# Patient Record
Sex: FEMALE | Race: WHITE | Employment: PART TIME | URBAN - METROPOLITAN AREA
[De-identification: names, ages, dates, MRNs, and addresses within clinical notes are randomized per-mention and may not be internally consistent; named-entity substitution may affect disease eponyms.]

---

## 2017-03-02 ENCOUNTER — HOSPITAL ENCOUNTER (OUTPATIENT)
Dept: WOMENS IMAGING | Age: 62
Discharge: HOME | End: 2017-03-02
Attending: SURGERY | Admitting: SURGERY

## 2017-03-02 DIAGNOSIS — Z12.31 VISIT FOR SCREENING MAMMOGRAM: ICD-10-CM

## 2017-08-03 ENCOUNTER — TELEPHONE (OUTPATIENT)
Dept: CASE MANAGEMENT | Age: 62
End: 2017-08-03

## 2017-11-27 ENCOUNTER — HOSPITAL ENCOUNTER (OUTPATIENT)
Dept: CT IMAGING | Age: 62
Discharge: HOME | End: 2017-11-27
Attending: INTERNAL MEDICINE | Admitting: INTERNAL MEDICINE

## 2017-11-27 DIAGNOSIS — Z87.891 PERSONAL HISTORY OF NICOTINE DEPENDENCE: ICD-10-CM

## 2017-11-27 DIAGNOSIS — Z87.891 PERSONAL HISTORY OF TOBACCO USE: ICD-10-CM

## 2018-03-08 ENCOUNTER — HOSPITAL ENCOUNTER (OUTPATIENT)
Dept: WOMENS IMAGING | Age: 63
Discharge: HOME | End: 2018-03-08
Attending: SURGERY | Admitting: SURGERY

## 2018-03-08 DIAGNOSIS — Z12.31 VISIT FOR SCREENING MAMMOGRAM: ICD-10-CM

## 2018-12-06 ENCOUNTER — HOSPITAL ENCOUNTER (OUTPATIENT)
Dept: CT IMAGING | Age: 63
Discharge: HOME | End: 2018-12-06
Payer: COMMERCIAL

## 2018-12-06 DIAGNOSIS — F17.200 TOBACCO USE DISORDER: ICD-10-CM

## 2018-12-06 PROCEDURE — 71250 CT THORAX DX C-: CPT

## 2019-03-14 ENCOUNTER — HOSPITAL ENCOUNTER (OUTPATIENT)
Dept: WOMENS IMAGING | Age: 64
Discharge: HOME | End: 2019-03-14
Payer: MEDICARE

## 2019-03-14 DIAGNOSIS — Z12.31 VISIT FOR SCREENING MAMMOGRAM: ICD-10-CM

## 2019-03-14 PROCEDURE — 77067 SCR MAMMO BI INCL CAD: CPT

## 2020-08-13 ENCOUNTER — HOSPITAL ENCOUNTER (OUTPATIENT)
Dept: WOMENS IMAGING | Age: 65
Discharge: HOME | End: 2020-08-13
Payer: MEDICARE

## 2020-08-13 PROCEDURE — 77067 SCR MAMMO BI INCL CAD: CPT

## 2021-09-02 ENCOUNTER — HOSPITAL ENCOUNTER (OUTPATIENT)
Dept: WOMENS IMAGING | Age: 66
Discharge: HOME | End: 2021-09-02
Payer: MEDICARE

## 2021-09-02 DIAGNOSIS — Z12.31 VISIT FOR SCREENING MAMMOGRAM: ICD-10-CM

## 2021-09-02 PROCEDURE — 77063 BREAST TOMOSYNTHESIS BI: CPT

## 2022-07-20 RX ORDER — ASPIRIN 81 MG/1
81 TABLET, CHEWABLE ORAL DAILY
COMMUNITY

## 2022-07-20 RX ORDER — METOPROLOL SUCCINATE 25 MG/1
25 TABLET, EXTENDED RELEASE ORAL DAILY
COMMUNITY

## 2022-07-20 NOTE — PROGRESS NOTES
[facility] ENDOSCOPY COLONOSCOPY PRE-OPERATIVE INSTRUCTIONS    Procedure date__7/25/22_______Arrival time____700________        Surgery time______800______       Clear liquids the day before the procedure. Do not eat or drink anything within 5 hours of your procedure. This includes water chewing gum, mints and ice chips. You may brush your teeth and gargle the morning of your surgery, but do not swallow the water    You may be asked to stop blood thinners such as Coumadin, Plavix, Fragmin, Lovenox, etc., or any anti-inflammatories such as:  Aspirin, Ibuprofen, Advil, Naproxen prior to your procedure. We also ask that you stop any OTC medications such as fish oil, vitamin E, glucosamine, garlic, Multivitamins, COQ 10, etc.    You must make arrangements for a responsible adult to arrive with you and stay in our waiting area during your procedure. They will also need to take you home after your procedure. For your safety you will not be allowed to leave alone or drive yourself home. Also for your safety, it is strongly suggested that someone stay with you the first 24 hours after your procedure. For your comfort, please wear simple loose fitting clothing to the center. Please do not bring valuables. If you have a living will and a durable power of  for healthcare, please bring in a copy.      You will need to bring a photo ID and insurance card    Our goal is to provide you with excellent care so if you have any questions, please contact us at the [facility] at [phone]         Please note these are generalized instructions for all colonoscopy cases, you may be provided with more specific instructions if necessary

## 2022-07-22 ENCOUNTER — ANESTHESIA EVENT (OUTPATIENT)
Dept: ENDOSCOPY | Age: 67
End: 2022-07-22
Payer: MEDICARE

## 2022-07-25 ENCOUNTER — HOSPITAL ENCOUNTER (OUTPATIENT)
Age: 67
Setting detail: OUTPATIENT SURGERY
Discharge: HOME | End: 2022-07-25
Attending: INTERNAL MEDICINE | Admitting: INTERNAL MEDICINE
Payer: MEDICARE

## 2022-07-25 ENCOUNTER — ANESTHESIA (OUTPATIENT)
Dept: ENDOSCOPY | Age: 67
End: 2022-07-25
Payer: MEDICARE

## 2022-07-25 VITALS
RESPIRATION RATE: 18 BRPM | WEIGHT: 165 LBS | SYSTOLIC BLOOD PRESSURE: 122 MMHG | DIASTOLIC BLOOD PRESSURE: 76 MMHG | HEIGHT: 63 IN | HEART RATE: 79 BPM | OXYGEN SATURATION: 100 % | BODY MASS INDEX: 29.23 KG/M2 | TEMPERATURE: 97 F

## 2022-07-25 DIAGNOSIS — Z12.11 SCREEN FOR COLON CANCER: ICD-10-CM

## 2022-07-25 PROCEDURE — 3700000001 HC ADD 15 MINUTES (ANESTHESIA): Performed by: INTERNAL MEDICINE

## 2022-07-25 PROCEDURE — 2709999900 HC NON-CHARGEABLE SUPPLY: Performed by: INTERNAL MEDICINE

## 2022-07-25 PROCEDURE — 88305 TISSUE EXAM BY PATHOLOGIST: CPT

## 2022-07-25 PROCEDURE — 3609010600 HC COLONOSCOPY POLYPECTOMY SNARE/COLD BIOPSY: Performed by: INTERNAL MEDICINE

## 2022-07-25 PROCEDURE — 7100000011 HC PHASE II RECOVERY - ADDTL 15 MIN: Performed by: INTERNAL MEDICINE

## 2022-07-25 PROCEDURE — 7100000010 HC PHASE II RECOVERY - FIRST 15 MIN: Performed by: INTERNAL MEDICINE

## 2022-07-25 PROCEDURE — 2580000003 HC RX 258: Performed by: ANESTHESIOLOGY

## 2022-07-25 PROCEDURE — 3700000000 HC ANESTHESIA ATTENDED CARE: Performed by: INTERNAL MEDICINE

## 2022-07-25 PROCEDURE — 6360000002 HC RX W HCPCS: Performed by: NURSE ANESTHETIST, CERTIFIED REGISTERED

## 2022-07-25 RX ORDER — SODIUM CHLORIDE 0.9 % (FLUSH) 0.9 %
5-40 SYRINGE (ML) INJECTION EVERY 12 HOURS SCHEDULED
Status: CANCELLED | OUTPATIENT
Start: 2022-07-25

## 2022-07-25 RX ORDER — SODIUM CHLORIDE 0.9 % (FLUSH) 0.9 %
5-40 SYRINGE (ML) INJECTION PRN
Status: CANCELLED | OUTPATIENT
Start: 2022-07-25

## 2022-07-25 RX ORDER — SODIUM CHLORIDE 0.9 % (FLUSH) 0.9 %
5-40 SYRINGE (ML) INJECTION EVERY 12 HOURS SCHEDULED
Status: DISCONTINUED | OUTPATIENT
Start: 2022-07-25 | End: 2022-07-25 | Stop reason: HOSPADM

## 2022-07-25 RX ORDER — SODIUM CHLORIDE 0.9 % (FLUSH) 0.9 %
5-40 SYRINGE (ML) INJECTION PRN
Status: DISCONTINUED | OUTPATIENT
Start: 2022-07-25 | End: 2022-07-25 | Stop reason: HOSPADM

## 2022-07-25 RX ORDER — SODIUM CHLORIDE 9 MG/ML
INJECTION, SOLUTION INTRAVENOUS PRN
Status: DISCONTINUED | OUTPATIENT
Start: 2022-07-25 | End: 2022-07-25 | Stop reason: HOSPADM

## 2022-07-25 RX ORDER — SODIUM CHLORIDE 9 MG/ML
INJECTION, SOLUTION INTRAVENOUS PRN
Status: CANCELLED | OUTPATIENT
Start: 2022-07-25

## 2022-07-25 RX ORDER — DIPHENHYDRAMINE HYDROCHLORIDE 50 MG/ML
12.5 INJECTION INTRAMUSCULAR; INTRAVENOUS
Status: CANCELLED | OUTPATIENT
Start: 2022-07-25 | End: 2022-07-25

## 2022-07-25 RX ORDER — ONDANSETRON 2 MG/ML
4 INJECTION INTRAMUSCULAR; INTRAVENOUS
Status: CANCELLED | OUTPATIENT
Start: 2022-07-25 | End: 2022-07-25

## 2022-07-25 RX ORDER — PROPOFOL 10 MG/ML
INJECTION, EMULSION INTRAVENOUS CONTINUOUS PRN
Status: DISCONTINUED | OUTPATIENT
Start: 2022-07-25 | End: 2022-07-25 | Stop reason: SDUPTHER

## 2022-07-25 RX ORDER — PROPOFOL 10 MG/ML
INJECTION, EMULSION INTRAVENOUS PRN
Status: DISCONTINUED | OUTPATIENT
Start: 2022-07-25 | End: 2022-07-25 | Stop reason: SDUPTHER

## 2022-07-25 RX ORDER — FOLIC ACID 1 MG/1
1 TABLET ORAL DAILY
COMMUNITY

## 2022-07-25 RX ADMIN — SODIUM CHLORIDE: 9 INJECTION, SOLUTION INTRAVENOUS at 07:36

## 2022-07-25 RX ADMIN — PROPOFOL 100 MG: 10 INJECTION, EMULSION INTRAVENOUS at 08:04

## 2022-07-25 RX ADMIN — PROPOFOL 140 MCG/KG/MIN: 10 INJECTION, EMULSION INTRAVENOUS at 08:04

## 2022-07-25 ASSESSMENT — PAIN - FUNCTIONAL ASSESSMENT: PAIN_FUNCTIONAL_ASSESSMENT: NONE - DENIES PAIN

## 2022-07-25 ASSESSMENT — LIFESTYLE VARIABLES: SMOKING_STATUS: 0

## 2022-07-25 NOTE — ANESTHESIA PRE PROCEDURE
Department of Anesthesiology  Preprocedure Note       Name:  Ollis Severs   Age:  75 y.o.  :  1955                                          MRN:  [id number]         Date:  2022      Surgeon: Chelsey Naqvi):  Nupur Starr MD    Procedure: Procedure(s):  COLORECTAL CANCER SCREENING, NOT HIGH RISK    Medications prior to admission:   Prior to Admission medications    Medication Sig Start Date End Date Taking? Authorizing Provider   folic acid (FOLVITE) 1 MG tablet Take 1 mg by mouth in the morning. Yes Historical Provider, MD   metoprolol succinate (TOPROL XL) 25 MG extended release tablet Take 25 mg by mouth in the morning. Yes Historical Provider, MD   aspirin 81 MG chewable tablet Take 81 mg by mouth in the morning. Yes Historical Provider, MD   Vitamin D (CHOLECALCIFEROL) 1000 UNITS CAPS capsule Take 1,000 Units by mouth daily. Historical Provider, MD   atorvastatin (LIPITOR) 20 MG tablet Take 80 mg by mouth in the morning.     Historical Provider, MD       Current medications:    Current Facility-Administered Medications   Medication Dose Route Frequency Provider Last Rate Last Admin    sodium chloride flush 0.9 % injection 5-40 mL  5-40 mL IntraVENous 2 times per day Karla Olivares MD        sodium chloride flush 0.9 % injection 5-40 mL  5-40 mL IntraVENous PRN Karla Olivares MD        0.9 % sodium chloride infusion   IntraVENous PRN Karla Olivares  mL/hr at 22 0736 New Bag at 22 0736       Allergies:  No Known Allergies    Problem List:    Patient Active Problem List   Diagnosis Code    [facility] C50.919    Tobacco abuse Z72.0       Past Medical History:        Diagnosis Date    Arthritis     [facility] ([facility]     Presence of Watchman left atrial appendage closure device        Past Surgical History:        Procedure Laterality Date    CARDIAC SURGERY      CHOLECYSTECTOMY  1985    HYSTERECTOMY, TOTAL ABDOMINAL (CERVIX REMOVED)  2013    KNEE ARTHROSCOPY  03/01/2013    LYMPH NODE DISSECTION         Social History:    Social History     Tobacco Use    Smoking status: Some Days     Packs/day: 0.25     Years: 37.00     Pack years: 9.25     Types: Cigarettes     Start date: 1/1/1979    Smokeless tobacco: Former     Quit date: 9/1/2013   Substance Use Topics    Alcohol use: Yes     Alcohol/week: 0.0 standard drinks     Comment: socially                                 Ready to quit: Not Answered  Counseling given: Not Answered      Vital Signs (Current):   Vitals:    07/20/22 1107 07/25/22 0727   BP:  128/81   Pulse:  100   Resp:  16   Temp:  97.7 °F (36.5 °C)   TempSrc:  Oral   SpO2:  98%   Weight: 168 lb (76.2 kg) 165 lb (74.8 kg)   Height: 5' 3.5\" (1.613 m) 5' 3\" (1.6 m)                                              BP Readings from Last 3 Encounters:   07/25/22 128/81   12/09/16 128/78   12/11/15 120/80       NPO Status: Time of last liquid consumption: 0300                        Time of last solid consumption: 2100                        Date of last liquid consumption: 07/25/22                        Date of last solid food consumption: 07/23/22    BMI:   Wt Readings from Last 3 Encounters:   07/25/22 165 lb (74.8 kg)   12/09/16 178 lb (80.7 kg)   12/11/15 170 lb (77.1 kg)     Body mass index is 29.23 kg/m². CBC: No results found for: WBC, RBC, HGB, HCT, MCV, RDW, PLT    CMP:   Lab Results   Component Value Date/Time    BUN 11 12/05/2014 10:20 AM    CREATININE 0.7 12/05/2014 10:20 AM       POC Tests: No results for input(s): POCGLU, POCNA, POCK, POCCL, POCBUN, POCHEMO, POCHCT in the last 72 hours.     Coags: No results found for: PROTIME, INR, APTT    HCG (If Applicable): No results found for: PREGTESTUR, PREGSERUM, HCG, HCGQUANT     ABGs: No results found for: PHART, PO2ART, UHB1GID, NLC1BZK, BEART, V3HRAINZ     Type & Screen (If Applicable):  No results found for: LABABO, LABRH    Drug/Infectious Status (If Applicable):  No results found for: HIV, HEPCAB    COVID-19 Screening (If Applicable): No results found for: COVID19        Anesthesia Evaluation  Patient summary reviewed no history of anesthetic complications:   Airway: Mallampati: II  TM distance: >3 FB   Neck ROM: full  Mouth opening: > = 3 FB   Dental: normal exam         Pulmonary:Negative Pulmonary ROS       (-) not a current smoker                           Cardiovascular:    (+) hypertension:, dysrhythmias (Watchman): atrial fibrillation, hyperlipidemia         Beta Blocker:  Dose within 24 Hrs         Neuro/Psych:                ROS comment: Blind in right eye, [place] GI/Hepatic/Renal:        (-) liver disease and no renal disease       Endo/Other: Negative Endo/Other ROS       (-) diabetes mellitus, hypothyroidism, hyperthyroidism               Abdominal:             Vascular: negative vascular ROS. Other Findings:           Anesthesia Plan      MAC     ASA 3       Induction: intravenous. Anesthetic plan and risks discussed with patient. Plan discussed with CRNA. This pre-anesthesia assessment may be used as a history and physical.    DOS STAFF ADDENDUM:    Pt seen and examined, chart reviewed (including anesthesia, drug and allergy history). No interval changes to history and physical examination. Anesthetic plan, risks, benefits, alternatives, and personnel involved discussed with patient. Patient verbalized an understanding and agrees to proceed.       Severiano Bickers, MD  July 25, 2022  7:51 AM

## 2022-07-25 NOTE — OP NOTE
COLONOSCOPY     Patient: Elyssa Borja MRN: [id number]   YOB: 1955 Age: 75 y.o. Sex: female       Admitting Physician: Saji Trevizo     Primary Care Physician: Mich Mullins MD      DATE OF PROCEDURE: 7/25/2022  PROCEDURE: Colonoscopy    PREOPERATIVE DIAGNOSIS: Screen for colon cancer  HPI: This is a 75y.o. year old female who presents today for colon cancer screening and screening colonoscopy. The patient has a history of colon polyps. ENDOSCOPIST: Shaquille Perez MD    POSTOPERATIVE DIAGNOSIS:    1. Two small ascending colon polyp, cold snared and removed  2. Small sigmoid colon polyp, cold snared and removed  3. Sigmoid diverticulosis    PLAN:   1. Follow-up biopsies; the patient to contact me in 1 week for results  2. Tentative surveillance colonoscopy in 5 years    INFORMED CONSENT:  Informed consent for colonoscopy was obtained. The benefits and risks including adverse medicine reaction and perforation have been explained. The patient's questions were answered and the patient agreed to proceed. ASA: ASA 2 - Patient with mild systemic disease with no functional limitations     SEDATION: MAC    The patient's vital signs, cardiac status, pulmonary status, abdominal status and mental status were stable for the procedure. The patient's vital signs and respiratory function as monitored by oxygen saturation remained stable. COLON PREPARATION:  The patient was given a split colon preparation and the preparation was adequate. Procedure Details: An anal exam was performed and this was unremarkable. A digital rectal exam was performed and no masses palpated. The Olympus videocolonoscope  was inserted in the rectum and carefully advanced to the cecum as identified by IC valve, crow's foot appearance and appendix. The cecum was photodocumented.   The colonoscope was slowly withdrawn and retrograde examination of the colon was carefully performed with inspection around and between folds. The ascending colon and cecum were intubated twice with repeat antegrade and retrograde examination. Retroflexion in the rectum was performed. Cecum Intubated: Yes    Findings: There was an 8 to 10 mm sessile polyp in the ascending colon that was cold snared and removed. There was a second 2 mm sessile polyp in the ascending colon that was cold snared and removed. Both polyps were submitted together. In the distal sigmoid colon there was a 4 mm sessile polyp that was cold snared and removed. There are diverticula in the sigmoid colon.     Estimated Blood Loss:  Minimal  Complications: None    Signed By: Gonzalez Gleason MD

## 2022-07-25 NOTE — H&P
Gastroenterology Outpatient History and Physical     Patient: Duong Ervin MRN: [id number] Sex: female   YOB: 1955 Age: 75 y.o. Location: [address] [place] [address] 12    Date:7/25/2022  Primary Care Physician: Damon Chapin MD         Patient: Duong Ervin    Physician: Magnus Dewey MD    History of Present Illness: History of colon polyp. Colon cancer screening  Review of Systems:  Weight Loss: No  Dysphagia: No  Dyspepsia: No  History:  Past Medical History:   Diagnosis Date    Arthritis     Breast cancer ([address]     Presence of Watchman left atrial appendage closure device       Past Surgical History:   Procedure Laterality Date    CARDIAC SURGERY      CHOLECYSTECTOMY  02/01/1985    HYSTERECTOMY, TOTAL ABDOMINAL (CERVIX REMOVED)  11/18/2013    KNEE ARTHROSCOPY  03/01/2013    LYMPH NODE DISSECTION        Social History     Socioeconomic History    Marital status:      Spouse name: None    Number of children: None    Years of education: None    Highest education level: None   Tobacco Use    Smoking status: Some Days     Packs/day: 0.25     Years: 37.00     Pack years: 9.25     Types: Cigarettes     Start date: 1/1/1979    Smokeless tobacco: Former     Quit date: 9/1/2013   Substance and Sexual Activity    Alcohol use: Yes     Alcohol/week: 0.0 standard drinks     Comment: socially     Drug use: No      Family History   Problem Relation Age of Onset    Cancer Mother         uterine    Hypertension Mother      Allergies: No Known Allergies  Medications:   Prior to Admission medications    Medication Sig Start Date End Date Taking? Authorizing Provider   folic acid (FOLVITE) 1 MG tablet Take 1 mg by mouth in the morning. Yes Historical Provider, MD   metoprolol succinate (TOPROL XL) 25 MG extended release tablet Take 25 mg by mouth in the morning. Yes Historical Provider, MD   aspirin 81 MG chewable tablet Take 81 mg by mouth in the morning.    Yes Historical Provider, MD

## 2022-07-25 NOTE — DISCHARGE INSTRUCTIONS

## 2022-07-25 NOTE — ANESTHESIA POSTPROCEDURE EVALUATION
Department of Anesthesiology  Postprocedure Note    Patient: Yue Winter  MRN: [id number]  YOB: 1955  Date of evaluation: 7/25/2022      Procedure Summary     Date: 07/25/22 Room / Location: [address]    Anesthesia Start: 0801 Anesthesia Stop: 0831    Procedure: COLONOSCOPY POLYPECTOMY SNARE/COLD BIOPSY Diagnosis:       Screen for colon cancer      (Screen for colon cancer)    Surgeons: Angelica Rubio MD Responsible Provider: Cassie Stafford MD    Anesthesia Type: MAC ASA Status: 3          Anesthesia Type: No value filed.     Isi Phase I: Isi Score: 10    Isi Phase II: Isi Score: 10      Anesthesia Post Evaluation    Patient location during evaluation: PACU  Patient participation: complete - patient participated  Level of consciousness: awake and alert  Pain score: 0  Nausea & Vomiting: no nausea  Complications: no  Cardiovascular status: hemodynamically stable  Respiratory status: acceptable  Hydration status: stable

## 2022-07-28 NOTE — RESULT ENCOUNTER NOTE
Surveillance colonoscopy done July 25 for history of colon polyps showed 3 adenomas which were removed and sigmoid diverticulosis.   I left a message reporting the results and recommended a surveillance colonoscopy in 5 years

## 2022-09-15 ENCOUNTER — HOSPITAL ENCOUNTER (OUTPATIENT)
Dept: WOMENS IMAGING | Age: 67
Discharge: HOME | End: 2022-09-15
Payer: MEDICARE

## 2022-09-15 DIAGNOSIS — Z12.31 BREAST CANCER SCREENING BY MAMMOGRAM: ICD-10-CM

## 2022-09-15 PROCEDURE — 77063 BREAST TOMOSYNTHESIS BI: CPT

## 2023-09-21 ENCOUNTER — HOSPITAL ENCOUNTER (OUTPATIENT)
Dept: WOMENS IMAGING | Age: 68
Discharge: HOME | End: 2023-09-21
Payer: MEDICARE

## 2023-09-21 DIAGNOSIS — Z12.31 VISIT FOR SCREENING MAMMOGRAM: ICD-10-CM

## 2023-09-21 PROCEDURE — 77063 BREAST TOMOSYNTHESIS BI: CPT

## 2024-10-10 ENCOUNTER — HOSPITAL ENCOUNTER (OUTPATIENT)
Dept: WOMENS IMAGING | Age: 69
Discharge: HOME | End: 2024-10-10
Payer: MEDICARE

## 2024-10-10 VITALS — BODY MASS INDEX: 27.46 KG/M2 | HEIGHT: 63 IN | WEIGHT: 155 LBS

## 2024-10-10 DIAGNOSIS — Z12.31 BREAST CANCER SCREENING BY MAMMOGRAM: ICD-10-CM

## 2024-10-10 PROCEDURE — 77063 BREAST TOMOSYNTHESIS BI: CPT

## (undated) DEVICE — SNARE ENDOSCP POLYP 2.4 MM 240 CM 10 MM 2.8 MM CAPTIVATOR